# Patient Record
(demographics unavailable — no encounter records)

---

## 2025-04-29 NOTE — PHYSICAL EXAM
[de-identified] : General: No acute distress Respiratory: Nonlabored Cardiovascular: Warm and well-perfused   Right hand Skin: Intact, no swelling, no ecchymosis Motor: AIN PIN M R U motor intact Sensation: No numbness or tingling Can make a full fist and extend all fingers  Tender to palpation at thumb CMC joint + grind test   Pain with thumb range of motion Warm and well-perfused brisk cap refill [de-identified] :  3 views of the b/l wrists  were obtained and reviewed in clinic showing no fractures or dislocations, Mild-moderate right thumb CMC J OA, no lesions, and no soft tissue abnormalities

## 2025-04-29 NOTE — ASSESSMENT
[FreeTextEntry1] : 50F with right thumb CMCJ OA flare    -Discussed diagnosis, natural history of condition, and treatment options -Aleve 1 in am and 1 in pm with meals for 3 weeks -Splint provided today -Steroid provided today -OT script for thenar strengthening -Ice at night, heat in am -Return in 3 weeks

## 2025-04-29 NOTE — HISTORY OF PRESENT ILLNESS
[Right] : right hand dominant [FreeTextEntry1] : Patient presents with right hand pain, mostly in her thumb, pain radiates to her upper arm. Pain started about 02/ 2025, it was not an injury. Constant pain at base of thumb. No treatment.

## 2025-04-29 NOTE — PROCEDURE
[FreeTextEntry1] :   Procedure: Injection: After proceeding with informed consent and discussing the risks, benefits, and alternatives to a steroid injection (such as infection, skin discoloration, and skin atrophy) 10 mg of Kenalog and 1 cc of 1% lidocaine was injected into the right thumb CMCJ under sterile conditions. The patient tolerated the procedure well and there were no complications. Post-injection instructions were discussed with the patient.

## 2025-05-27 NOTE — PHYSICAL EXAM
[de-identified] : General: No acute distress Respiratory: Nonlabored Cardiovascular: Warm and well-perfused   Right hand Skin: Intact, no swelling, no ecchymosis Motor: AIN PIN M R U motor intact Sensation: No numbness or tingling Can make a full fist and extend all fingers  NonTender to palpation at thumb CMC joint + grind test   No Pain with thumb range of motion Warm and well-perfused brisk cap refill

## 2025-05-27 NOTE — ASSESSMENT
[FreeTextEntry1] : 50F with right thumb CMCJ OA flare  At this point her symptoms have entirely resolved after 1 steroid injection.  I recommend using the hand normally but keeping an eye on her symptoms.  If they return she should place the brace back on use Voltaren gel and rest the thumb.  If the symptoms persist we can consider a repeat steroid injection however I do not recommend the steroid injections closer than 3 months apart.  We again discussed surgery in the form of basal joint arthroplasty but I do not feel this is indicated at this time.  Patient return as needed if symptoms return.

## 2025-05-27 NOTE — HISTORY OF PRESENT ILLNESS
[FreeTextEntry1] : Patient presents for follow up of right CMC joint arthritis. She received an injection on April 29th, 2025, which alleviated her pain and discomfort.  She tried therapy a few times but did not find it very helpful.  She had been bracing when she was having symptoms.  However now she has no pain and has returned to all activities